# Patient Record
Sex: FEMALE | Race: WHITE | NOT HISPANIC OR LATINO | Employment: FULL TIME | URBAN - NONMETROPOLITAN AREA
[De-identification: names, ages, dates, MRNs, and addresses within clinical notes are randomized per-mention and may not be internally consistent; named-entity substitution may affect disease eponyms.]

---

## 2020-07-09 ENCOUNTER — OFFICE VISIT (OUTPATIENT)
Dept: ORTHOPEDIC SURGERY | Facility: CLINIC | Age: 25
End: 2020-07-09

## 2020-07-09 VITALS — BODY MASS INDEX: 29.41 KG/M2 | WEIGHT: 183 LBS | HEIGHT: 66 IN

## 2020-07-09 DIAGNOSIS — M79.641 RIGHT HAND PAIN: ICD-10-CM

## 2020-07-09 DIAGNOSIS — S62.316A CLOSED DISPLACED FRACTURE OF BASE OF FIFTH METACARPAL BONE OF RIGHT HAND, INITIAL ENCOUNTER: Primary | ICD-10-CM

## 2020-07-09 PROCEDURE — 99203 OFFICE O/P NEW LOW 30 MIN: CPT | Performed by: NURSE PRACTITIONER

## 2020-07-09 PROCEDURE — 26600 TREAT METACARPAL FRACTURE: CPT | Performed by: NURSE PRACTITIONER

## 2020-07-09 NOTE — PROGRESS NOTES
María Camarillo is a 24 y.o. female   Primary provider:  Provider, No Known       Chief Complaint   Patient presents with   • Right Hand - Pain, Initial Evaluation     Xray at fast pace.  HISTORY OF PRESENT ILLNESS: Patient is a 24-year-old who presents today to be evaluated for a displaced fracture at the base of the fifth metacarpal.  Injury occurred on 7/5/2020 after hitting a wall.  Reports that currently pain is 10 out of 10.  Patient offered narcotic pain medication, patient declines.  Patient states she would prefer to take OTC medication if needed.  Patient reports associated swelling and bruising.  Patient denies burning and tingling in right hand, but does report slight numb/tight sensation.     Pain   This is a new problem. Episode onset: 7/5/2020. The problem occurs constantly. Associated symptoms comments: Aching, bruising, and swelling. The symptoms are aggravated by walking (sitting, driving).        CONCURRENT MEDICAL HISTORY:    History reviewed. No pertinent past medical history.    Allergies   Allergen Reactions   • Ciprofloxacin Hcl Unknown - High Severity       No current outpatient medications on file.    No past surgical history on file.    Family History   Problem Relation Age of Onset   • Cancer Other    • Diabetes Other    • Hypertension Other         Social History     Socioeconomic History   • Marital status: Single     Spouse name: Not on file   • Number of children: Not on file   • Years of education: Not on file   • Highest education level: Not on file   Tobacco Use   • Smoking status: Never Smoker   • Smokeless tobacco: Never Used   Substance and Sexual Activity   • Alcohol use: Never     Frequency: Never   • Drug use: Never   • Sexual activity: Defer        Review of Systems   Constitutional: Negative.    HENT: Negative.    Eyes: Negative.    Respiratory: Negative.    Cardiovascular: Negative.    Gastrointestinal: Negative.    Endocrine: Negative.    Genitourinary: Negative.   "  Musculoskeletal:        Right hand pain, swelling, bruising   Skin: Negative.    Allergic/Immunologic: Negative.    Neurological: Negative.    Hematological: Negative.    Psychiatric/Behavioral: Negative.        PHYSICAL EXAMINATION:       Ht 167.6 cm (66\")   Wt 83 kg (183 lb)   BMI 29.54 kg/m²     Physical Exam   Constitutional: She is oriented to person, place, and time. Vital signs are normal. She appears well-developed and well-nourished.  Non-toxic appearance. No distress.   HENT:   Head: Normocephalic.   Eyes: No scleral icterus.   Pulmonary/Chest: Effort normal. No respiratory distress.   Neurological: She is alert and oriented to person, place, and time.   Skin: Skin is warm and dry. She is not diaphoretic.   Psychiatric: She has a normal mood and affect. Her behavior is normal. Judgment and thought content normal.   Nursing note and vitals reviewed.      GAIT:     [x]  Normal  []  Antalgic    Assistive device: [x]  None  []  Walker     []  Crutches  []  Cane     []  Wheelchair  []  Stretcher    Right Hand Exam     Tenderness   The patient is experiencing tenderness in the ulnar area and escobar area.    Muscle Strength   Right wrist normal muscle strength: Deferred.    Other   Erythema: absent  Right hand sensation: patient able to differentiate between sharp and dull sensation.    Comments:  Patient has decreased range of motion due to swelling, patient is able to minimally flex and extend  Hand/fingers              Xr Hand 3+ View Right    Addendum Date: 7/10/2020 Addendum:   ADDENDUM Study: X-ray hand 3+ views, right Comparison: None Narrative: There is a displaced, oblique fracture at the base of the fifth metacarpal.  On oblique view there is also a subtle transverse lucency that may represent hairline fracture at the base of the 4th metacarpal.  There is mild soft tissue swelling on the ulnar side of the hand.  No other acute bony abnormality identified. Kaitlin RIZZO 7/9/2020 11:20 AM "     Result Date: 7/9/2020  Narrative: Study: X-ray hand 3+ views, right Comparison: None Narrative: There is a displaced, oblique fracture at the base of the fifth metacarpal.  On oblique view there is also a subtle transverse lucency that may represent hairline fracture.  There is mild soft tissue swelling on the ulnar side of the hand.  No other acute bony abnormality identified. Kaitlin RIZZO 7/9/2020 11:20 AM          ASSESSMENT:    Diagnoses and all orders for this visit:    Closed displaced fracture of base of fifth metacarpal bone of right hand, initial encounter    Right hand pain  -     XR Hand 3+ View Right          PLAN    X-rays reviewed with Dr. Owusu, recommended nonsurgical treatment of close displaced fracture at base of fifth metacarpal bone at this time. Fiberglass ulnar splint made and applied today.  Patient to return in 1 week, if swelling has gone down at that time plan to place patient in an Exos splint.  Patient to return in 1 week and in 4 weeks for repeat x-rays.     Body mass index is 29.54 kg/m².    Return in about 1 week (around 7/16/2020).    Kaitlin Herrera, APRN

## 2020-07-14 DIAGNOSIS — S62.316A CLOSED DISPLACED FRACTURE OF BASE OF FIFTH METACARPAL BONE OF RIGHT HAND, INITIAL ENCOUNTER: Primary | ICD-10-CM

## 2020-07-16 ENCOUNTER — OFFICE VISIT (OUTPATIENT)
Dept: ORTHOPEDIC SURGERY | Facility: CLINIC | Age: 25
End: 2020-07-16

## 2020-07-16 VITALS — BODY MASS INDEX: 29.25 KG/M2 | WEIGHT: 182 LBS | HEIGHT: 66 IN

## 2020-07-16 DIAGNOSIS — S62.316D CLOSED DISPLACED FRACTURE OF BASE OF FIFTH METACARPAL BONE OF RIGHT HAND WITH ROUTINE HEALING, SUBSEQUENT ENCOUNTER: Primary | ICD-10-CM

## 2020-07-16 DIAGNOSIS — M79.641 RIGHT HAND PAIN: ICD-10-CM

## 2020-07-16 PROCEDURE — 29125 APPL SHORT ARM SPLINT STATIC: CPT | Performed by: NURSE PRACTITIONER

## 2020-07-16 PROCEDURE — 99024 POSTOP FOLLOW-UP VISIT: CPT | Performed by: NURSE PRACTITIONER

## 2020-07-16 NOTE — PROGRESS NOTES
"María Camarillo is a 24 y.o. female returns for     Chief Complaint   Patient presents with   • Right Hand - Follow-up       HISTORY OF PRESENT ILLNESS: Patient is a 24-year-old who presents today in follow-up for displaced fracture at the base of the fifth metacarpal in the right hand.  Original date of injury was 7/5/2020.  Patient reports that pain is improving, patient rates pain today 5 out of 10.  Patient also reports decreased swelling and tightness, patient states she does occasionally have a numb sensation in right hand, but this usually improves with position change.  Patient denies current numbness, tingling, burning in right hand.       CONCURRENT MEDICAL HISTORY:    The following portions of the patient's history were reviewed and updated as appropriate: allergies, current medications, past family history, past medical history, past social history, past surgical history and problem list.     ROS  No fevers or chills.  No chest pain or shortness of air.  No GI or  disturbances.  Right hand pain.    PHYSICAL EXAMINATION:       Ht 167.6 cm (66\")   Wt 82.6 kg (182 lb)   BMI 29.38 kg/m²     Physical Exam   Constitutional: She is oriented to person, place, and time. She appears well-developed and well-nourished.  Non-toxic appearance. No distress.   HENT:   Head: Normocephalic.   Pulmonary/Chest: Effort normal. No respiratory distress.   Neurological: She is alert and oriented to person, place, and time.   Skin: Skin is warm and dry.   Psychiatric: She has a normal mood and affect. Her behavior is normal. Judgment and thought content normal.   Nursing note and vitals reviewed.      GAIT:     [x]  Normal  []  Antalgic    Assistive device: [x]  None  []  Walker     []  Crutches  []  Cane     []  Wheelchair  []  Stretcher    Right Hand Exam     Tenderness   The patient is experiencing tenderness in the escobar area and ulnar area.    Range of Motion   Wrist   Extension: normal   Flexion: normal   Pronation: " normal   Supination: normal   Hand   MP Little: normal   PIP Little: normal   DIP Little: normal     Muscle Strength   : 5/5     Other   Erythema: absent  Sensation: normal  Pulse: present    Comments:  Swelling and bruising have improved since last visit.  Patient able to extend and flex right little finger.  Sensation in right hand is normal today.              Xr Hand 3+ View Right    Result Date: 7/16/2020  Narrative: Study: X-ray hand 3+ views, right Comparison: 7/9/2020 Narrative: Healing, displaced, oblique fracture at the base of the fifth metacarpal seen again today, essentially unchanged from previous imaging. Subtle transverse lucency possibly representing hairline fracture at base of fourth metacarpal seen on last oblique view not as observable today. No other acute bony abnormality identified. Kaitlin Herrera APRN 7/16/2020 1:23 PM    Xr Hand 3+ View Right    Addendum Date: 7/10/2020 Addendum:   ADDENDUM Study: X-ray hand 3+ views, right Comparison: None Narrative: There is a displaced, oblique fracture at the base of the fifth metacarpal.  On oblique view there is also a subtle transverse lucency that may represent hairline fracture at the base of the 4th metacarpal.  There is mild soft tissue swelling on the ulnar side of the hand.  No other acute bony abnormality identified. Kaitlin Herrera APRN 7/9/2020 11:20 AM     Result Date: 7/9/2020  Narrative: Study: X-ray hand 3+ views, right Comparison: None Narrative: There is a displaced, oblique fracture at the base of the fifth metacarpal.  On oblique view there is also a subtle transverse lucency that may represent hairline fracture.  There is mild soft tissue swelling on the ulnar side of the hand.  No other acute bony abnormality identified. Kaitlin Herrera APRN 7/9/2020 11:20 AM            ASSESSMENT:    Diagnoses and all orders for this visit:    Closed displaced fracture of base of fifth metacarpal bone of right hand with routine healing, subsequent  encounter    Right hand pain          PLAN    X-rays reviewed, fracture stable.  Patient is not wearing fiberglass ulnar splint today.  Swelling has gone down considerably.  Exos boxkeon's fracture splint applied today.  Patient instructed to wear splint at all times other than showering for next 2 weeks, then patient instructed that she may come out of the splint on occasion when in a controlled environment in which she is not at risk of injuring hand further.  Patient reports pain is still controlled, patient does not need prescription for pain medication at this time.  Patient to return in 4 weeks for repeat x-rays.    Return in about 4 weeks (around 8/13/2020).    Kaitlin Hrerera, APRN

## 2020-08-11 DIAGNOSIS — S62.316D CLOSED DISPLACED FRACTURE OF BASE OF FIFTH METACARPAL BONE OF RIGHT HAND WITH ROUTINE HEALING, SUBSEQUENT ENCOUNTER: Primary | ICD-10-CM

## 2020-08-12 ENCOUNTER — OFFICE VISIT (OUTPATIENT)
Dept: ORTHOPEDIC SURGERY | Facility: CLINIC | Age: 25
End: 2020-08-12

## 2020-08-12 VITALS — WEIGHT: 185 LBS | HEIGHT: 66 IN | BODY MASS INDEX: 29.73 KG/M2

## 2020-08-12 DIAGNOSIS — M79.641 RIGHT HAND PAIN: ICD-10-CM

## 2020-08-12 DIAGNOSIS — S62.316D CLOSED DISPLACED FRACTURE OF BASE OF FIFTH METACARPAL BONE OF RIGHT HAND WITH ROUTINE HEALING, SUBSEQUENT ENCOUNTER: Primary | ICD-10-CM

## 2020-08-12 PROCEDURE — 99024 POSTOP FOLLOW-UP VISIT: CPT | Performed by: NURSE PRACTITIONER

## 2020-08-12 NOTE — PROGRESS NOTES
"The patient is a 25 y.o. female who presents for followup.    Chief Complaint   Patient presents with   • Right Hand - Follow-up, Pain       HPI: Patient is a 25-year-old female who presents to follow-up on displaced fracture at the base of her fifth metacarpal in her right hand.  Date of original injury was 7/5/2020.  Patient reports that she is no longer having any pain.  She denies numbness, tingling, burning in right hand.  She does report some decreased  strength in comparison to her left hand.  She is not needing anything for pain.  She has no unusual complaints.      No current outpatient medications on file.    Allergies   Allergen Reactions   • Ciprofloxacin Hcl Unknown - High Severity        ROS:  No fevers or chills.  No nausea or vomiting.  No musculoskeletal complaints other than decreased  strength in right hand.    PHYSICAL EXAM:    Vitals:    08/12/20 1036   Weight: 83.9 kg (185 lb)   Height: 167.6 cm (66\")   PainSc: 0-No pain       GAIT:     [x]  Normal  []  Antalgic    Assistive device: [x]  None  []  Walker     []  Crutches  []  Cane     []  Wheelchair  []  Stretcher    Patient is awake and alert, answers questions appropriately, and is in no apparent distress.     Right Hand Exam     Tenderness   The patient is experiencing no tenderness.     Range of Motion   The patient has normal right wrist ROM.     Muscle Strength   : 5/5     Other   Erythema: absent  Sensation: normal  Pulse: present    Comments:  No swelling or bruising today.  There is small deformity at metacarpal head causing some fifth knuckle depression.  Patient able to make tight fist.      Left Hand Exam     Muscle Strength   :  5/5                 ASSESSMENT:  Diagnoses and all orders for this visit:    Closed displaced fracture of base of fifth metacarpal bone of right hand with routine healing, subsequent encounter    Right hand pain        PLAN:    XR reviewed. Evidence of good healing. Subjective decreased  " strength,  feels equal on exam today.  Patient given stress ball.  Patient instructed to resume activities of daily living  with right hand but increase activity slowly.  Patient given orders to return to work with limitations of no lifting/pushing/pulling over 10 pounds.  Patient to return in 4 weeks, if doing okay will release fully at this time.  If patient still has feelings of decreased  strength will send patient to hand physical therapy.  Plan to get final x-rays in 4 weeks.    Patient's Body mass index is 29.86 kg/m². BMI is above normal parameters. Recommendations include: exercise counseling and nutrition counseling.  Return in about 4 weeks (around 9/9/2020).    MATTHIAS Hobbs

## 2020-09-08 DIAGNOSIS — M79.641 RIGHT HAND PAIN: Primary | ICD-10-CM

## 2020-09-09 ENCOUNTER — OFFICE VISIT (OUTPATIENT)
Dept: ORTHOPEDIC SURGERY | Facility: CLINIC | Age: 25
End: 2020-09-09

## 2020-09-09 VITALS — WEIGHT: 190 LBS | BODY MASS INDEX: 30.53 KG/M2 | HEIGHT: 66 IN

## 2020-09-09 DIAGNOSIS — R29.898 DECREASED GRIP STRENGTH OF RIGHT HAND: ICD-10-CM

## 2020-09-09 DIAGNOSIS — S62.316D CLOSED DISPLACED FRACTURE OF BASE OF FIFTH METACARPAL BONE OF RIGHT HAND WITH ROUTINE HEALING, SUBSEQUENT ENCOUNTER: ICD-10-CM

## 2020-09-09 DIAGNOSIS — M79.641 RIGHT HAND PAIN: Primary | ICD-10-CM

## 2020-09-09 PROCEDURE — 99024 POSTOP FOLLOW-UP VISIT: CPT | Performed by: NURSE PRACTITIONER

## 2020-09-09 NOTE — PROGRESS NOTES
"María Camarillo is a 25 y.o. female      Chief Complaint   Patient presents with   • Right Hand - Follow-up       HISTORY OF PRESENT ILLNESS:   Patient is a 25-year-old female who presents today for follow-up of fifth metacarpal fracture of her right hand.  Date of injury was on 7/5/2020.  At last appointment patient was not having any pain, numbness, tingling, burning in right hand though she did have decreased  strength.  She continues to deny burning, tingling, numbness in right hand.  However patient states that she has noticed that she will have pain when doing certain things such as grabbing and pulling things towards her or with tight gripping.  Patient states that she feels that her  strength is still decreased.  Patient reports that she did not use stress ball because it was too hard to squeeze.  X-rays were taken today.             CONCURRENT MEDICAL HISTORY:    The following portions of the patient's history were reviewed and updated as appropriate: allergies, current medications, past family history, past medical history, past social history, past surgical history and problem list.     ROS  No fevers or chills.  No chest pain or shortness of air.  No GI or  disturbances. Decreased  in right hand.     PHYSICAL EXAMINATION:       Ht 167.6 cm (66\")   Wt 86.2 kg (190 lb)   BMI 30.67 kg/m²     Physical Exam   Constitutional: She is oriented to person, place, and time. She appears well-developed and well-nourished.  Non-toxic appearance. No distress.   HENT:   Head: Normocephalic.   Pulmonary/Chest: Effort normal. No respiratory distress.   Neurological: She is alert and oriented to person, place, and time.   Skin: Skin is warm and dry.   Psychiatric: She has a normal mood and affect. Her behavior is normal. Judgment and thought content normal.   Nursing note and vitals reviewed.      GAIT:     [x]  Normal  []  Antalgic    Assistive device: [x]  None  []  Walker     []  Crutches  []  Cane     []  " Wheelchair  []  Stretcher    Right Hand Exam     Tenderness   The patient is experiencing no tenderness.     Range of Motion   The patient has normal right wrist ROM.     Other   Erythema: absent  Sensation: normal  Pulse: present    Comments:  No swelling or bruising today.  There is small deformity at metacarpal head causing some fifth knuckle depression.  Patient able to make tight fist. No malrotation of little finger.               Xr Hand 3+ View Right    Result Date: 9/9/2020  Narrative: Study: XR hand 3+ view, right Comparison: 7/16/2020 Narrative:  Oblique fracture at base of fifth metacarpal has continued evidence of good bony healing with increased bony consolidation of fracture line in comparison to previous imaging.  No other acute bony abnormality identified.  Soft tissues are unremarkable.  Kaitlin Herrera APRN 8/13/2020    Xr Hand 3+ View Right    Result Date: 8/13/2020  Narrative: Study: XR hand 3+ view, right Comparison: 7/16/2020 Narrative: Minimally displaced, oblique fracture at base of fifth metacarpal has evidence of good bony healing with increased bony consolidation of fracture line in comparison to previous imaging.  No other acute bony abnormality identified. Kaitlin Herrera APRN 8/13/2020            ASSESSMENT:    Diagnoses and all orders for this visit:    Right hand pain    Decreased  strength of right hand  -     Ambulatory Referral to Physical Therapy Evaluate and treat; (as indicated ); Strengthening    Closed displaced fracture of base of fifth metacarpal bone of right hand with routine healing, subsequent encounter          PLAN    Patient's Body mass index is 30.67 kg/m². BMI is above normal parameters. Recommendations include: exercise counseling and nutrition counseling.      X-rays reviewed, good evidence of healing.  Patient is not having any bony tenderness in area of fracture.  Patient continues to report decreased  strength.  Patient is not having pain currently but states  with certain movements she will have pain.  She reports these movements are when she pulls or lifts heavier objects.  At this time plan to continue limitations of no lifting/pushing/pulling over 10 pounds.  Also to proceed with hand physical therapy at this time.  Patient to return in 4 weeks for recheck.      Return in about 4 weeks (around 10/7/2020).    MATTHIAS Hobbs

## 2021-12-07 ENCOUNTER — OFFICE VISIT (OUTPATIENT)
Dept: SLEEP MEDICINE | Facility: HOSPITAL | Age: 26
End: 2021-12-07

## 2021-12-07 VITALS
OXYGEN SATURATION: 98 % | WEIGHT: 214.8 LBS | HEART RATE: 98 BPM | SYSTOLIC BLOOD PRESSURE: 122 MMHG | BODY MASS INDEX: 34.52 KG/M2 | HEIGHT: 66 IN | DIASTOLIC BLOOD PRESSURE: 79 MMHG

## 2021-12-07 DIAGNOSIS — G47.10 HYPERSOMNIA: ICD-10-CM

## 2021-12-07 DIAGNOSIS — Z79.890 LONG-TERM CURRENT USE OF TESTOSTERONE REPLACEMENT THERAPY: ICD-10-CM

## 2021-12-07 DIAGNOSIS — R06.81 WITNESSED EPISODE OF APNEA: Primary | ICD-10-CM

## 2021-12-07 DIAGNOSIS — R06.83 SNORING: ICD-10-CM

## 2021-12-07 PROCEDURE — 99203 OFFICE O/P NEW LOW 30 MIN: CPT | Performed by: INTERNAL MEDICINE

## 2021-12-07 RX ORDER — TESTOSTERONE CYPIONATE 200 MG/ML
INJECTION, SOLUTION INTRAMUSCULAR
COMMUNITY
Start: 2021-11-26

## 2021-12-07 NOTE — PROGRESS NOTES
"       [facility] [place] [facility]  [address] [place] [address] Drive  [place], [place] [address]  Phone: [phone]  Fax: [phone]      New Patient Sleep Medicine Consultation  Sleep Visit Only    Encounter Date: 12/7/2021         Patient's PCP: Meet Vega MD  Referring provider: No ref. provider found  Reason for consultation chief complaint: snoring    CHIEF COMPLAINT:  :   Chief Complaint   Patient presents with   • Fatigue     neck--- 15.5\"   • Heartburn   • Unable To Fall Asleep   • Daytime Sleepiness   • Morning Headaches   • Snoring   • Frequent Awakenings   • Witnessed Apnea        Encounter Date: 12/7/2021           HISTORY OF PRESENT ILLNESS:  Referred by Provider for Sleep Evaluation for sleep Disturbances   Patient going through sex modification, on testosterone 200 mg once a week   Patient sleeps with girlfriend    María Camarillo is a 26 y.o. female whose bedtime is ~ 7:30 am. She  falls asleep after 25 minutes, and is up 4-5 times per night. She wakes up ~ 12:30 pm. She endorses 4-5 hours of sleep. She drinks no cups of coffee, no teas, and no sodas per day. She drinks no alcoholic beverages per week, he does not drink energy drinks.    María Camarillo admits to snoring, unrestful sleep, working night shift or rotataing shifts, excessive daytime sleepiness, morning headaches, stop breathing during sleep, frequent unexplained arousals, sleeping less than 6 hours per night, Disturbed or restless sleep, Up to the bathroom at night, sleepy driving, night sweats and restless legs at night. She denies cataplexy, sleep paralysis, or hypnagogic hallucinations. She does not take sedatives or hypnotics. She has No sleepiness with driving. Patient naps for 1 hour. He has gained 15 pounds last 6 months, Patient does not exercise regularly.    Brief Social History:  She is a never smoker.  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    PAST MEDICAL AND " "SURGICAL HISTORIES:  Denied DM, HTN, DVT, PE    Allergies   Allergen Reactions   • Ciprofloxacin Hcl Unknown - High Severity         Family History   Problem Relation Age of Onset   • Cancer Other    • Diabetes Other    • Hypertension Other      Social History     Socioeconomic History   • Marital status: Single   Tobacco Use   • Smoking status: Never Smoker   • Smokeless tobacco: Never Used   Substance and Sexual Activity   • Alcohol use: Never   • Drug use: Never   • Sexual activity: Defer       MEDICATIONS:    Current Outpatient Medications:   •  Testosterone Cypionate (DEPOTESTOTERONE CYPIONATE) 200 MG/ML injection, , Disp: , Rfl:       Review of Systems   Constitutional: Positive for fatigue.   HENT: Negative for congestion and nosebleeds.    Eyes: Negative for pain.   Respiratory: Negative for choking and shortness of breath.    Cardiovascular: Negative for chest pain.   Gastrointestinal: Negative for abdominal pain.   Endocrine: Negative for polydipsia.   Musculoskeletal: Negative for arthralgias.   Skin: Negative for color change.   Allergic/Immunologic: Negative for immunocompromised state.   Neurological: Negative for syncope.   Hematological: Does not bruise/bleed easily.   Psychiatric/Behavioral: Positive for sleep disturbance.       OBJECTIVE:    Vitals:    12/07/21 1046   BP: 122/79   Pulse: 98   SpO2: 98%         12/07/21  1046   Weight: 97.4 kg (214 lb 12.8 oz)     Body mass index is 34.69 kg/m². Patient's Body mass index is 34.69 kg/m².     Neck circumference: 15.5\"  ESS: 5      PHYSICAL EXAM:          General: Alert. Cooperative. No acute distress.             Head:  Normocephalic. Symmetrical. Atraumatic.              Eyes: Sclera clear. No icterus.              Nose: No septal deviation. No drainage. No nasal Polyps, No Skin Ulcers             Neck:  Trachea midline, No Stridor, No Supraclavicular Adenopathy          Throat: No oral lesions. No thrush. Moist mucous membranes.    Tongue is " normal    Dentition is good       Pharynx: Posterior pharyngeal pillars are wide    Mallampati score of II (hard and soft palate, upper portion of tonsils anduvula visible)    Pharynx is nonerythematous                     Lungs:  Clear to auscultation bilaterally. No wheezes. No rhonchi. No rales. Respirations regular, even and unlabored.            Heart:  Regular rhythm and normal rate. Normal S1 and S2. No murmur.     Abdomen:  Soft, non-tender and non-distended. Normal bowel sounds.   Extremities:  No edema, no Clubbing, No Cyanosis, upper extremity pulses palpable              Skin: No jaundice or rash, no nasal bridge ulceration           Neuro: Moves all 4 extremities  Psychiatric: Normal mood and affect.      IMPRESSION AND RECOMMENDATIONS:  29-year-old patient referred for a sleep disturbance evaluation patient ongoing sex modification with monthly testosterone shots 200 mg weekly and complaints of disruptive snoring, daytime sleepiness, [place] Sleepiness Scale of 5, neck circumference 15.5 inches.     1. Witnessed episode of apnea  - We will request home sleep testing and if it is nondiagnostic we will proceed with diagnostic PSG  - Home Sleep Study; Future    Sleep apnea implications such as including but not limited to: Uncontrolled hypertension, sudden death, massive heart attack, stroke risk, sleep deprived motor vehicle accident, even death has been discussed and patient verbalized understanding.  Printed material regarding drowsy driving tips, sleep better and information regarding the sleep apnea has been provided to the patient    2. Snoring Disruptive  - Home Sleep Study; Future    3. Long-term current use of testosterone replacement therapy  - Long-term use of testosterone replacement therapy has been associated with worsening of his sleep breathing disorder among other complications  - Home Sleep Study; Future    4. Hypersomnia-Suspected MARYBEL  - Home Sleep Study; Future    5.  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      Contraindications to home sleep test: none    FOLLOW UP:  Return in about 3 weeks (around 12/28/2021) for Follow up after study.      Oumar Ellis MD, FACP, FCCP  Diplomate in Pulmonary & Sleep Medicine    This document has been electronically signed by Oumar Ellis MD on December 7, 2021 13:19 CST      Instructions provided as documented in the After Visit Summary.  The patient indicated understanding of the diagnosis and agreed with the plan of care    EMR Dragon/Transcription disclaimer:   Some of this note may be an electronic transcription/translation of spoken language to printed text. The electronic translation of spoken language may permit erroneous, or at times, nonsensical words or phrases to be inadvertently transcribed; Although I have reviewed the note for such errors, some may still exist.      CC: Meet Vega MD          No ref. provider found

## 2021-12-13 ENCOUNTER — HOSPITAL ENCOUNTER (OUTPATIENT)
Dept: SLEEP MEDICINE | Facility: HOSPITAL | Age: 26
Discharge: HOME | End: 2021-12-13
Admitting: INTERNAL MEDICINE

## 2021-12-13 DIAGNOSIS — G47.10 HYPERSOMNIA: ICD-10-CM

## 2021-12-13 DIAGNOSIS — R06.83 SNORING: ICD-10-CM

## 2021-12-13 DIAGNOSIS — R06.81 WITNESSED EPISODE OF APNEA: ICD-10-CM

## 2021-12-13 DIAGNOSIS — Z79.890 LONG-TERM CURRENT USE OF TESTOSTERONE REPLACEMENT THERAPY: ICD-10-CM

## 2021-12-13 PROCEDURE — 95800 SLP STDY UNATTENDED: CPT | Performed by: INTERNAL MEDICINE

## 2021-12-13 PROCEDURE — 95800 SLP STDY UNATTENDED: CPT

## 2021-12-27 ENCOUNTER — OFFICE VISIT (OUTPATIENT)
Dept: SLEEP MEDICINE | Facility: HOSPITAL | Age: 26
End: 2021-12-27

## 2021-12-27 VITALS
HEART RATE: 99 BPM | OXYGEN SATURATION: 98 % | HEIGHT: 66 IN | WEIGHT: 214.5 LBS | BODY MASS INDEX: 34.47 KG/M2 | SYSTOLIC BLOOD PRESSURE: 144 MMHG | DIASTOLIC BLOOD PRESSURE: 85 MMHG

## 2021-12-27 DIAGNOSIS — G47.33 OSA (OBSTRUCTIVE SLEEP APNEA): Primary | ICD-10-CM

## 2021-12-27 PROBLEM — G47.10 HYPERSOMNIA: Status: RESOLVED | Noted: 2021-12-07 | Resolved: 2021-12-27

## 2021-12-27 PROCEDURE — 99212 OFFICE O/P EST SF 10 MIN: CPT | Performed by: NURSE PRACTITIONER

## 2021-12-27 NOTE — PATIENT INSTRUCTIONS
Obesity, Adult  Obesity is the condition of having too much total body fat. Being overweight or obese means that your weight is greater than what is considered healthy for your body size. Obesity is determined by a measurement called BMI. BMI is an estimate of body fat and is calculated from height and weight. For adults, a BMI of 30 or higher is considered obese.  Obesity can lead to other health concerns and major illnesses, including:  · Stroke.  · Coronary artery disease (CAD).  · Type 2 diabetes.  · Some types of cancer, including cancers of the colon, breast, uterus, and gallbladder.  · Osteoarthritis.  · High blood pressure (hypertension).  · High cholesterol.  · Sleep apnea.  · Gallbladder stones.  · Infertility problems.  What are the causes?  Common causes of this condition include:  · Eating daily meals that are high in calories, sugar, and fat.  · Being born with genes that may make you more likely to become obese.  · Having a medical condition that causes obesity, including:  ? Hypothyroidism.  ? Polycystic ovarian syndrome (PCOS).  ? Binge-eating disorder.  ? Cushing syndrome.  · Taking certain medicines, such as steroids, antidepressants, and seizure medicines.  · Not being physically active (sedentary lifestyle).  · Not getting enough sleep.  · Drinking high amounts of sugar-sweetened beverages, such as soft drinks.  What increases the risk?  The following factors may make you more likely to develop this condition:  · Having a family history of obesity.  · Being a woman of  descent.  · Being a man of  descent.  · Living in an area with limited access to:  ? [place], recreation centers, or sidewalks.  ? Healthy food choices, such as grocery stores and farmers' markets.  What are the signs or symptoms?  The main sign of this condition is having too much body fat.  How is this diagnosed?  This condition is diagnosed based on:  · Your BMI. If you are an adult with a BMI of 30 or  higher, you are considered obese.  · Your waist circumference. This measures the distance around your waistline.  · Your skinfold thickness. Your health care provider may gently pinch a fold of your skin and measure it.  You may have other tests to check for underlying conditions.  How is this treated?  Treatment for this condition often includes changing your lifestyle. Treatment may include some or all of the following:  · Dietary changes. This may include developing a healthy meal plan.  · Regular physical activity. This may include activity that causes your heart to beat faster (aerobic exercise) and strength training. Work with your health care provider to design an exercise program that works for you.  · Medicine to help you lose weight if you are unable to lose 1 pound a week after 6 weeks of healthy eating and more physical activity.  · Treating conditions that cause the obesity (underlying conditions).  · Surgery. Surgical options may include gastric banding and gastric bypass. Surgery may be done if:  ? Other treatments have not helped to improve your condition.  ? You have a BMI of 40 or higher.  ? You have life-threatening health problems related to obesity.  Follow these instructions at home:  Eating and drinking    · Follow recommendations from your health care provider about what you eat and drink. Your health care provider may advise you to:  ? Limit fast food, sweets, and processed snack foods.  ? Choose low-fat options, such as low-fat milk instead of whole milk.  ? Eat 5 or more servings of fruits or vegetables every day.  ? Eat at home more often. This gives you more control over what you eat.  ? Choose healthy foods when you eat out.  ? Learn to read food labels. This will help you understand how much food is considered 1 serving.  ? Learn what a healthy serving size is.  ? Keep low-fat snacks available.  ? Limit sugary drinks, such as soda, fruit juice, sweetened iced tea, and flavored  milk.  · Drink enough water to keep your urine pale yellow.  · Do not follow a fad diet. Fad diets can be unhealthy and even dangerous.    Physical activity  · Exercise regularly, as told by your health care provider.  ? Most adults should get up to 150 minutes of moderate-intensity exercise every week.  ? Ask your health care provider what types of exercise are safe for you and how often you should exercise.  · Warm up and stretch before being active.  · Cool down and stretch after being active.  · Rest between periods of activity.  Lifestyle  · Work with your health care provider and a dietitian to set a weight-loss goal that is healthy and reasonable for you.  · Limit your screen time.  · Find ways to reward yourself that do not involve food.  · Do not drink alcohol if:  ? Your health care provider tells you not to drink.  ? You are pregnant, may be pregnant, or are planning to become pregnant.  · If you drink alcohol:  ? Limit how much you use to:  § 0-1 drink a day for women.  § 0-2 drinks a day for men.  ? Be aware of how much alcohol is in your drink. In the [place] one drink equals one 12 oz bottle of beer (355 mL), one 5 oz glass of wine (148 mL), or one 1½ oz glass of hard liquor (44 mL).  General instructions  · Keep a weight-loss journal to keep track of the food you eat and how much exercise you get.  · Take over-the-counter and prescription medicines only as told by your health care provider.  · Take vitamins and supplements only as told by your health care provider.  · Consider joining a support group. Your health care provider may be able to recommend a support group.  · Keep all follow-up visits as told by your health care provider. This is important.  Contact a health care provider if:  · You are unable to meet your weight loss goal after 6 weeks of dietary and lifestyle changes.  Get help right away if you are having:  · Trouble breathing.  · Suicidal thoughts or behaviors.  Summary  · Obesity is  the condition of having too much total body fat.  · Being overweight or obese means that your weight is greater than what is considered healthy for your body size.  · Work with your health care provider and a dietitian to set a weight-loss goal that is healthy and reasonable for you.  · Exercise regularly, as told by your health care provider. Ask your health care provider what types of exercise are safe for you and how often you should exercise.  This information is not intended to replace advice given to you by your health care provider. Make sure you discuss any questions you have with your health care provider.  Document Revised: 08/22/2019 Document Reviewed: 08/22/2019  [employer]vier Patient Education © 2021 [employer]

## 2021-12-27 NOTE — PROGRESS NOTES
Sleep Clinic Follow-Up    CHIEF COMPLAINT: follow up sleep testing    LAST OV: 12/07/2021 (I reviewed this note)    HPI:  The patient is a 26 y.o. female.  I discussed the results of the recent home sleep study performed on 12/13/2021.  Presumed sleep time was 5 hrs 51 minutes. The AHI/DENISE was 9.1, RDI 31.1. Mean O2 was 95% with a arya of 91%. Snoring present.       INTERVAL MEDICAL HISTORY: No change since last office visit in regard to the patient's bedtime routine, medications, or diagnosis.    PAP Data:  Currently not on therapy   Mask type: mask fitting per DME  DME: [facility]       Bed time: 0730  Sleep latency: 25 minutes  Number of times awakens during the night: 4-5  Wake time: 1230  Estimated total sleep time at night: 4-5 hours  Caffeine intake: 0  Alcohol intake: 0  Nap time: daily    Sleepiness with Driving: denies       MEDICATIONS:   Current Outpatient Medications:   •  Testosterone Cypionate (DEPOTESTOTERONE CYPIONATE) 200 MG/ML injection, , Disp: , Rfl:     PHYSICAL EXAM  Vitals:    12/27/21 1015   BP: 144/85   Pulse: 99   SpO2: 98%           12/27/21  1015   Weight: 97.3 kg (214 lb 8 oz)       Body mass index is 34.64 kg/m². Patient's Body mass index is 34.64 kg/m². indicating that she is obese (BMI >30). Obesity-related health conditions include the following: obstructive sleep apnea. Obesity is unchanged. BMI is is above average; BMI management plan is completed. We discussed portion control and increasing exercise..      Gen:  No acute distress heard in voice, alert, oriented  Eyes:    Moist conjunctiva, EOMI   Lungs:  Normal effort, non-labored breathing  Heart:  No lower extremity edema   Psych:  Appropriate affect   Neuro:  Cn2-12 appear intact     Assessment and Plan:    1. Mild Obstructive sleep apnea -   1. Start on auto PAP 5-15 cm H2O with mask fitting per DME and PAP supplies   2. DME: [facility]   3. Continue PAP as prescribed.   4. Monitor compliance report   5. Drowsy driving tips- do not  drive if feeling sleepy   6. Return to clinic in 10 weeks with compliance report, or sooner if needed       The sleep results were discussed in detail with the patient.  The risks of untreated sleep apnea were reviewed.  Treatment options for sleep apnea were discussed in detail. Patient wants to pursue PAP therapy.  I counseled patient on PAP management, maintenance and compliance, sleep hygiene, including regular sleep wake schedule and stimulus control therapy, the importance of weight reduction, safe driving and abstaining from smoking and alcohol consumption, as well as other sedatives.     Other treatment options including UPPP surgery and dental appliances were discussed. The patient decided to pursue CPAP therapy.      All of the patient's questions were answered. She states understanding and agreement with my assessment and plan as above.     I obtained a brief history from the patient, reviewed the medical problems and current medications, and made medical decisions regarding treatment based on that information. Total visit time 15 min, with total of 9 minutes spent counseling patient regarding: PAP therapy, PAP compliance, PAP maintenance, Lifestyle modifications, Sleep hygiene and Study results.       Patient to follow up in 31-90 days with compliance report   She agrees to return sooner on a prn basis if sx change.           This document has been electronically signed by MATTHIAS Ritchie on December 27, 2021 10:25 CST            CC: Meet Vega MD          No ref. provider found